# Patient Record
Sex: MALE | Race: WHITE | URBAN - METROPOLITAN AREA
[De-identification: names, ages, dates, MRNs, and addresses within clinical notes are randomized per-mention and may not be internally consistent; named-entity substitution may affect disease eponyms.]

---

## 2020-08-20 ENCOUNTER — OFFICE VISIT (OUTPATIENT)
Dept: ORTHOPEDIC SURGERY | Age: 65
End: 2020-08-20
Payer: COMMERCIAL

## 2020-08-20 VITALS — BODY MASS INDEX: 32.2 KG/M2 | WEIGHT: 230 LBS | TEMPERATURE: 97.9 F | HEIGHT: 71 IN

## 2020-08-20 PROCEDURE — L3170 FOOT PLAS HEEL STABI PRE OTS: HCPCS | Performed by: ORTHOPAEDIC SURGERY

## 2020-08-20 PROCEDURE — 99203 OFFICE O/P NEW LOW 30 MIN: CPT | Performed by: ORTHOPAEDIC SURGERY

## 2020-08-20 RX ORDER — ATORVASTATIN CALCIUM 80 MG/1
80 TABLET, FILM COATED ORAL DAILY
COMMUNITY
Start: 2020-02-10

## 2020-08-20 RX ORDER — GLIPIZIDE 10 MG/1
TABLET ORAL
COMMUNITY
Start: 2020-08-07

## 2020-08-20 RX ORDER — LEVOTHYROXINE SODIUM 0.03 MG/1
37.5 TABLET ORAL DAILY
COMMUNITY
Start: 2020-06-12

## 2020-08-20 RX ORDER — CARVEDILOL 6.25 MG/1
6.25 TABLET ORAL 2 TIMES DAILY WITH MEALS
COMMUNITY
Start: 2020-07-08

## 2020-08-20 RX ORDER — LOSARTAN POTASSIUM 50 MG/1
50 TABLET ORAL DAILY
COMMUNITY
Start: 2020-04-09

## 2020-08-20 SDOH — HEALTH STABILITY: MENTAL HEALTH: HOW OFTEN DO YOU HAVE A DRINK CONTAINING ALCOHOL?: NEVER

## 2020-08-20 ASSESSMENT — ENCOUNTER SYMPTOMS
COLOR CHANGE: 0
COUGH: 0

## 2020-08-20 NOTE — PROGRESS NOTES
and Sexual Activity    Alcohol use: Never     Frequency: Never    Drug use: Never    Sexual activity: None   Lifestyle    Physical activity     Days per week: None     Minutes per session: None    Stress: None   Relationships    Social connections     Talks on phone: None     Gets together: None     Attends Evangelical service: None     Active member of club or organization: None     Attends meetings of clubs or organizations: None     Relationship status: None    Intimate partner violence     Fear of current or ex partner: None     Emotionally abused: None     Physically abused: None     Forced sexual activity: None   Other Topics Concern    None   Social History Narrative    None       Current Outpatient Medications   Medication Sig Dispense Refill    carvedilol (COREG) 6.25 MG tablet Take 6.25 mg by mouth 2 times daily (with meals)      levothyroxine (SYNTHROID) 25 MCG tablet Take 37.5 mcg by mouth daily      losartan (COZAAR) 50 MG tablet Take 50 mg by mouth daily      linagliptin (TRADJENTA) 5 MG tablet TAKE 1 TABLET BY MOUTH DAILY      glipiZIDE (GLUCOTROL) 10 MG tablet TAKE 1 TABLET BY MOUTH TWICE DAILY WITH MEALS      atorvastatin (LIPITOR) 80 MG tablet Take 80 mg by mouth daily       No current facility-administered medications for this visit. No Known Allergies      Review of Systems:  A 14 point review of systems was completed by the patient and is available in the media section of the scanned medical record and was reviewed on 8/20/2020. The review is negative with the exception of those things mentioned in the HPI and Past Medical History   Review of Systems   Constitutional: Negative for fever. Respiratory: Negative for cough. Musculoskeletal: Positive for arthralgias. Skin: Negative for color change.       Vital Signs:   Temp 97.9 °F (36.6 °C) (Infrared)   Ht 5' 11\" (1.803 m)   Wt 230 lb (104.3 kg)   BMI 32.08 kg/m²     General Exam:    General: Juana Malhotra is a healthy and well appearing 60y.o. year old male who is sitting comfortably in our office in no acute distress. Neuro: Alert & Oriented x 3,  normal,  no focal deficits noted. Normal mood & affect. Eyes: Sclera clear  Ears: Normal external ear  Mouth:  No perioral lesions  Pulm: Respirations unlabored and regular   Skin: Warm, well perfused      Knee Examination: Left    Inspection:   No effusion, ecchymosis, discoloration, erythema, excessive warmth. no gross deformities, no signs of infection. Palpation: Tenderness of the proximal medial tibia. There is no patellofemoral crepitus, there is mild medial joint line tenderness. No other osseous or soft tissue tenderness around the knee. Negative calf tenderness    Range of Motion:  0-145 degrees without pain or difficulty    Strength:  5/5 quadriceps, 5/5 hamstrings    Special Tests: Mild pain with compression of the medial joint space. No ligament instability, valgus and varus stress test are stable at 0 and 30°. Lachman's exhibits a solid endpoint. Negative posterior drawer. Negative Homans sign    Gait:  Steady, Non antalgic, without the use of assistive devices    Alignment:  Varus deformity    Neuro: Sensation equal bilateral lower extremities    Vascular: 2+ posterior tibialis pulse    Radiology:     X-rays obtained and reviewed in office: AP and merchant view of both knees and a lateral of the left. Impression: No fractures, dislocations, visible tumors, or signs of acute trauma. Mild DJD of the medial joint space bilaterally. Patient has appropriate joint spacing in the patellofemoral joints bilaterally. Patella is riding appropriately in the trochlear groove with no subluxation or tilt noted. No osteophytes or bone growths noted. No loose bodies or foreign bodies.         Office Procedures:  Orders Placed This Encounter   Procedures    XR KNEE LEFT (3 VIEWS)     Standing Status:   Future     Number of Occurrences:   1     Standing Expiration Date:   8/20/2021            Assessment: Patient is a 60 y.o. male mild DJD and probable stress/insufficiency fracture of the medial tibial plateau    Visit Diagnoses       Codes    Left knee pain, unspecified chronicity    -  Primary M25.562          No orders of the defined types were placed in this encounter. Medical decision making:  Patient's workup and evaluation were reviewed with the patient in the office today. All the patient's questions were answered while in the clinic. The patient is understanding of all instructions and agrees with the plan. Treatment Plan: We had a good discussion today about  stress fractures of the tibia and how he may have overdone it while in [place]. He should notice it improving over the next several days to weeks. He also has some mild DJD of the medial tibiofemoral joint which he can control with activity modifications, weight management and a few sessions of physical therapy to teach him some home exercises. If he does not notice improvement over the coming weeks, we will have him return to the office for further evaluation for possible meniscal tear. It was also noted that he had some varus alignment of his heels for which we gave him some lateral posts. All questions were answered and he was in agreement with the plan moving forward. Follow up: 4 weeks      Approximately 30 minutes was spent on patient education and coordinating care. Gisell Ku, [address] [facility]     08/20/20  4:20 PM    This dictation was performed with a verbal recognition program [facility]) and it was checked for errors. It is possible that there are still dictated errors within this office note. If so, please bring any errors to my attention for an addendum. All efforts were made to ensure that this office note is accurate.     Supervising Physician Attestation:  I, Dr. Giovani Noriega, personally performed the services described in this documentation as scribed above, and it is both accurate and complete and I agree with all pertinent clinical information. I personally interviewed the patient and performed a physical examination and medical decision making. I discussed the patient's condition and treatment options and have  reviewed and agree with the past medical, family and social history unless otherwise noted. All of the patient's questions were answered. Board Certified Orthopaedic Surgeon  [facility] and [address] and [address] [employer]  Professor of [address]            This dictation was performed with a verbal recognition program [facility]) and it was checked for errors. It is possible that there are still dictated errors within this office note. If so, please bring any errors to my attention for an addendum. All efforts were made to ensure that this office note is accurate.

## 2020-08-31 ENCOUNTER — HOSPITAL ENCOUNTER (OUTPATIENT)
Dept: PHYSICAL THERAPY | Age: 65
Setting detail: THERAPIES SERIES
Discharge: HOME | End: 2020-08-31
Payer: COMMERCIAL

## 2020-08-31 PROCEDURE — 97110 THERAPEUTIC EXERCISES: CPT | Performed by: PHYSICAL THERAPIST

## 2020-08-31 PROCEDURE — 97162 PT EVAL MOD COMPLEX 30 MIN: CPT | Performed by: PHYSICAL THERAPIST

## 2020-08-31 PROCEDURE — 97530 THERAPEUTIC ACTIVITIES: CPT | Performed by: PHYSICAL THERAPIST

## 2020-08-31 NOTE — FLOWSHEET NOTE
[facility] [address] [place] [address] [facility]    [facility] Therapy Daily Treatment Note  Date:  2020    Patient Name:  Clois Meckel    :  1955  MRN: [id number]  Restrictions/Precautions:    Medical/Treatment Diagnosis Information:  Diagnosis: M17.12 (ICD-10-CM) - Primary osteoarthritis of left knee;M25.562 (ICD-10-CM) - Left knee pain, unspecified chronicity  Treatment Diagnosis: Knee pain/ swelling/ difficulty walking/ limited ROM/ LE strength deficit/ LE balance deficit    Insurance/Certification information:   ANTHEM/ EFF 10-2-17/ FAM DED 3000; MET 87.97/ FAM OOP MAX 5000; MET 87.97/ CO INS COV 85% WHEN MET/ PAT RESPONS WHEN MET 15%/ NO VISIT LIMIT/ POLICY RENEWS / Yumiok Zapata BY [employer] [phone]; [phone]/ RAJI/ REF #[id number]/ PAG/ 20  Physician Information:  Gabbie Mosley MD  Plan of care signed (Y/N): Pending    Date of Patient follow up with Physician: 4 weeks for Dr. Angel Carr, unless asymptomatic    G-Code (if applicable):      Date G-Code Applied:  20   69/ 80= 86%    Progress Note: [x]  Yes  []  No  Next due by: Visit #10       Latex Allergy:  [x]NO      []YES  Preferred Language for Healthcare:   [x]English       []other:    Visit # Insurance Allowable   1 No visit limit  Medical necessity     Pain Scale:  0 /10 @ rest                   2-3/ 10 @ worst  Easing factors: Rest; ice; meds; heel wedge  Provocative factors: Positional changes; walking; stairs  Type: []? Constant       [x]? Intermittent  []? Radiating     []? Localized     []? other:                   SUBJECTIVE: Anna Dorantes a 60 y. o. male who presents for evaluation of his left knee.  The patient has had 8 weeks of left knee pain which started in [place] after hiking and being more active than he is accustomed to. Louie Fernández denies any injury. Louie Fernández has tried heat, ibuprofen and brace without much symptom relief.  Of note he has a heart condition which precludes him from taking strong anti-inflammatories. Mykel Figueredo states that he is able to perform his ADLs without an issue and is able to walk for as long as needed without taking breaks. The patient denies any clicking, popping, catching, giving way, joint locking, numbness, paresthesias, or weakness. States knee doing better since seeing Dr. Lis Jane with the use of ice/ Tylenol/ lateral heel wedge.         OBJECTIVE:       LEFS Score: 69/ 80= 86% (8/31/20; Initial)     8-31-20  Flexibility L R Comment   Hamstring ++ ++     Gastroc ++ ++     ITB ++ ++     Quad ++ ++                  8-31-20            ROM PROM AROM Overpressure Comment     L R L R L R     Flexion 125 125         L pain @ end ROM   Extension 0 0                                                    8-31-20  Strength L R Comment   Quad 4+/5 5-/5 NIMESH   Hamstring 4/5 4+/5     Gastroc 4+/5 5/5     Hip  flexion 4/5 4+/5     Hip abd 4/5 4+/5                            8-31-20  Special Test Results/Comment   Meniscal Click (-)  Pain with ROT   Crepitus 0-1/3  30-0°   Flexion Test (+)  Pain at end ROM   Valgus Laxity (-)   Varus Laxity (-)   Lachmans (-)   Drop Back (-)   Homans (-)   Temperature (-)   LE (+) lateral tilt  (-) J sign  (-) apprehension      8-31-20  Girth L R   Calf 37.5 40.0   Mid Patella 41.3 41.8   Suprapatellar 40.5 41.8   5cm above 43.9 45.5   15cm above 52.2 53.9      Reflexes/Sensation:               []?Dermatomes/Myotomes intact               []?Reflexes equal and normal bilaterally              [x]? Other: NT     Joint mobility:               []?Normal                       [x]? Hypo              []?Hyper     Palpation: MJL     Functional Mobility/Transfers: Independent     Posture: Bilateral genu varum     Bandages/Dressings/Incisions: Viscoheel lateral heel wedge L     Gait: WFL      RESTRICTIONS/PRECAUTIONS:  Bilateral knee OA; diabetes; cardiac defibrillator/ pacemaker surgery; high blood pressure    Exercises/Interventions:   Exercise/Equipment Resistance/Repetitions Other comments 8/31/2020   Stretching      Hamstring 30\"x 5  x   Hip Flexion      ITB 30\"x 5 90-90 ROT x   Grion      Quad 30\"x 5 Prone; strap x   Inclined Calf 30\"x 5  x   Towel Pull            SLR      Supine 3x 10  x   Prone      Abduction      Adducton      SLR+            Isometrics      Quad sets            Patellar Glides      Medial      Superior      Inferior            ROM      Passive      Active      Weight Shift      Hang Weights      Sheet Pulls      Ankle Pumps            CKC      Calf raises 3x 10  x   Wall sits      Step ups      1 leg stand      Squatting      CC TKE      Balance            PRE      Extension  RANGE:    Flexion  RANGE:          Cable Column            Leg Press  RANGE:          Bike      Treadmill            Seated clams 3x 10 Grey x                       Other Therapeutic Activities:   L Lateral Viscoheel    Home Exercise Program:   See above and attached. Initial HEP discussed and completed. Full written, verbal, and demonstration provided. Patient Education:  Full conservative instructions provided. Written and verbal guidelines provided for but not limited to: DME/ HEP/ ICE/ gait/ general medical instructions. Manual Treatments:       Therapeutic Exercise and NMR EXR  [x] (98094) Provided verbal/tactile cueing for activities related to strengthening, flexibility, endurance, ROM for improvements in LE, proximal hip, and core control with self care, mobility, lifting, ambulation. [x] (04693) Provided verbal/tactile cueing for activities related to improving balance, coordination, kinesthetic sense, posture, motor skill, proprioception  to assist with LE, proximal hip, and core control in self care, mobility, lifting, ambulation and eccentric single leg control.      NMR and Therapeutic Activities:    [x] (71479 or 01263) Provided verbal/tactile cueing for activities related to improving balance, coordination, kinesthetic sense, posture, motor skill, proprioception and motor activation to allow for proper function of core, proximal hip and LE with self care and ADLs  [] (63679) Gait Re-education- Provided training and instruction to the patient for proper LE, core and proximal hip recruitment and positioning and eccentric body weight control with ambulation re-education including up and down stairs     Home Exercise Program:    [x] (10533) Reviewed/Progressed HEP activities related to strengthening, flexibility, endurance, ROM of core, proximal hip and LE for functional self-care, mobility, lifting and ambulation/stair navigation   [x] (10025)Reviewed/Progressed HEP activities related to improving balance, coordination, kinesthetic sense, posture, motor skill, proprioception of core, proximal hip and LE for self care, mobility, lifting, and ambulation/stair navigation      Manual Treatments:  PROM / STM / Oscillations-Mobs:  G-I, II, III, IV (PA's, Inf., Post.)  [x] (53896) Provided manual therapy to mobilize LE, proximal hip and/or LS spine soft tissue/joints for the purpose of modulating pain, promoting relaxation,  increasing ROM, reducing/eliminating soft tissue swelling/inflammation/restriction, improving soft tissue extensibility and allowing for proper ROM for normal function with self care, mobility, lifting and ambulation. Modalities:    [] hot packs   [] EMS   [] Ultrasound  [x] ice/ CP   [] vasopneumatic  [] high volt/EGS  [] phono   [] tens    [] ionto  [] autorange/biodex  [] Interferential  [] other      Charges:  Timed Code Treatment Minutes: 35'   Total Treatment Minutes: 76'     [x] EVAL: L2  [x] LZ(03948) x  1   [] IONTO  [] NMR (67930) x      [] VASO  [] Manual ([id number]) x       [] Other:  [x] TA x  1    [] Mech Traction (12870)  [] ES(attended) (81675)      [] ES (un) (69209):     GOALS:   Patient stated goal: Would like to have less knee pain in order to resume activity   []? Progressing: []? Met: []?  Not Met: []? core/proximal hip strength and neuromuscular control              [x]? Decreased LE functional strength   [x]? Reduced balance/proprioceptive control              []?other:       Functional Activity Limitations (from functional questionnaire and intake)              [x]? Reduced ability to tolerate prolonged functional positions              [x]? Reduced ability or difficulty with changes of positions or transfers between positions              [x]? Reduced ability to maintain good posture and demonstrate good body mechanics with sitting, bending, and lifting              []?Reduced ability to sleep              []? Reduced ability or tolerance with driving and/or computer work              [x]? Reduced ability to perform lifting, carrying tasks              []?Reduced ability to squat              []?Reduced ability to forward bend              [x]? Reduced ability to ambulate prolonged functional periods/distances/surfaces              [x]? Reduced ability to ascend/descend stairs              [x]? Reduced ability to run, hop or jump              []?other:     Participation Restrictions              []?Reduced participation in self care activities              []?Reduced participation in home management activities              []?Reduced participation in work activities              [x]? Reduced participation in social activities. [x]? Reduced participation in sport activities.     Classification :               []?Signs/symptoms consistent with post-surgical status including decreased ROM, strength and function.              []?Signs/symptoms consistent with joint sprain/strain              [x]? Signs/symptoms consistent with patella-femoral syndrome              [x]? Signs/symptoms consistent with knee OA/hip OA              [x]? Signs/symptoms consistent with internal derangement of knee/Hip              [x]? Signs/symptoms consistent with functional hip weakness/NMR control                 []?Signs/symptoms consistent with tendinitis/tendinosis                      []?signs/symptoms consistent with pathology which may benefit from Dry needling                       []?other:       Prognosis/Rehab Potential:                                       []?Excellent              [x]? Good                         []?Fair              []?Poor     Tolerance of evaluation/treatment:   [] Patient tolerated treatment well [x] Patient limited by fatique  [x] Patient limited by pain  [] Patient limited by other medical complications  [x] Other: Moderate functional ADL limitations include, but not limited to knee pain/ swelling/ difficulty walking/ limited ROM/ LE muscle weakness/ LE balance deficit       Prognosis: [x] Good [] Fair  [] Poor    Patient Requires Follow-up: [x] Yes  [] No    PLAN:   [] Continue per plan of care [] Alter current plan (see comments)  [x] Plan of care initiated [] Hold pending MD visit [] Discharge  Frequency/Duration:  1 days per week for 8 Weeks:  Interventions:    SLR progression  CKC/ hip control  Balance  PF protection principles    Electronically signed by: Guero Pichardo PT    Note: If patient does not return for scheduled/ recommended follow up visits, this note will serve as a discharge from care along with most recent update on progress.

## 2020-08-31 NOTE — PLAN OF CARE
The [address] [place] [address] Johan [address]  [phone]                                                       Physical Therapy Certification    Dear Gena Ramires MD,    We had the pleasure of evaluating the following patient for physical therapy services at [address] [place] [address]. A summary of our findings can be found in the initial assessment below. This includes our plan of care. If you have any questions or concerns regarding these findings, please do not hesitate to contact me at the office phone number checked above.   Thank you for the referral.       Physician Signature:_______________________________Date:__________________  By signing above (or electronic signature), therapists plan is approved by physician      Patient: Nickie Kiran   : 1955   MRN: [id number]  Referring Physician: Referring Practitioner: Gena Ramires MD      Evaluation Date: 2020      Medical Diagnosis Information:  Diagnosis: M17.12 (ICD-10-CM) - Primary osteoarthritis of left knee;M25.562 (ICD-10-CM) - Left knee pain, unspecified chronicity   Treatment Diagnosis: Knee pain/ swelling/ difficulty walking/ limited ROM/ LE strength deficit/ LE balance deficit                                         Insurance information:  ANTHEM/ EFF 10-2-17/ FAM DED 3000; MET 87.97/ FAM OOP MAX 5000; MET 87.97/ CO INS COV 85% WHEN MET/ PAT RESPONS WHEN MET 15%/ NO VISIT LIMIT/ POLICY RENEWS 3-94-55/ Miranda Cook BY [employer] [phone]; [phone]/ RAJI/ REF #[id number]/ [facility]/ 20     Precautions/ Contra-indications: Bilateral knee OA; diabetes; cardiac defibrillator/ pacemaker surgery; high blood pressure    C-SSRS Triggered by Intake questionnaire (Past 2 wk assessment):   [x] No, Questionnaire did not trigger screening.   [] Yes, Patient intake triggered further evaluation      [] C-SSRS Screening completed  [] PCP notified via Plan of Care  [] Emergency services notified     Latex Allergy:  [x]NO      []YES  Preferred Language for Healthcare:   [x]English       []other:    SUBJECTIVE: Mike Smith is a 60 y.o. male who presents for evaluation of his left knee. The patient has had 8 weeks of left knee pain which started in [place] after hiking and being more active than he is accustomed to. He denies any injury. He has tried heat, ibuprofen and brace without much symptom relief. Of note he has a heart condition which precludes him from taking strong anti-inflammatories. He states that he is able to perform his ADLs without an issue and is able to walk for as long as needed without taking breaks. The patient denies any clicking, popping, catching, giving way, joint locking, numbness, paresthesias, or weakness. States knee doing better since seeing Dr. Rush Simmons with the use of ice/ Tylenol/ lateral heel wedge. Relevant Medical History: Diabetes (meds); high blood pressure (meds); cardiac (surgery for defibrillator/ pacemaker; meds); knee OA   Functional Disability Index: LEFS Score: 69/ 80= 86%    Pain Scale:  0 /10 @ rest  2-3/ 10 @ worst  Easing factors: Rest; ice; meds; heel wedge  Provocative factors: Positional changes; walking; stairs    Type: []Constant   [x]Intermittent  []Radiating []Localized []other:     Numbness/Tingling: None    Occupation/School:  (walking/ sitting/ squatting/ climbing)    Living Status/Prior Level of Function: Independent with ADLs and IADLs, Fitness walking; recreational softball; golf (1x week; Pre-Covid 3-4x week)    OBJECTIVE:      LEFS Score: 69/ 80= 86% (8/31/20;  Initial)    8-31-20  Flexibility L R Comment   Hamstring ++ ++    Gastroc ++ ++    ITB ++ ++    Quad ++ ++            8-31-20  ROM PROM AROM Overpressure Comment    L R L R L R    Flexion 125 125     L pain @ end ROM   Extension 0 0                              8-31-20  Strength L R Comment   Quad 4+/5 5-/5 NIMESH Hamstring 4/5 4+/5    Gastroc 4+/5 5/5    Hip  flexion 4/5 4+/5    Hip abd 4/5 4+/5                  8-31-20  Special Test Results/Comment   Meniscal Click (-)  Pain with ROT   Crepitus 0-1/3  30-0°   Flexion Test (+)  Pain at end ROM   Valgus Laxity (-)   Varus Laxity (-)   Lachmans (-)   Drop Back (-)   Homans (-)   Temperature (-)   LE (+) lateral tilt  (-) J sign  (-) apprehension     8-31-20  Girth L R   Calf 37.5 40.0   Mid Patella 41.3 41.8   Suprapatellar 40.5 41.8   5cm above 43.9 45.5   15cm above 52.2 53.9     Reflexes/Sensation:    []Dermatomes/Myotomes intact    []Reflexes equal and normal bilaterally   [x]Other: NT    Joint mobility:    []Normal    [x]Hypo   []Hyper    Palpation: MJL    Functional Mobility/Transfers: Independent    Posture: Bilateral genu varum    Bandages/Dressings/Incisions: Viscoheel lateral heel wedge L    Gait: [facility]    Orthopedic Special Tests:                        [x] Patient history, allergies, meds reviewed. Medical chart reviewed. See intake form. Review Of Systems (ROS):  [x]Performed Review of systems (Integumentary, CardioPulmonary, Neurological) by intake and observation. Intake form has been scanned into medical record. Patient has been instructed to contact their primary care physician regarding ROS issues if not already being addressed at this time.       Co-morbidities/Complexities (which will affect course of rehabilitation):   []None           Arthritic conditions   []Rheumatoid arthritis (M05.9)  [x]Osteoarthritis (M19.91)   Cardiovascular conditions   [x]Hypertension (I10)  []Hyperlipidemia (E78.5)  []Angina pectoris (I20)  []Atherosclerosis (I70)   Musculoskeletal conditions   []Disc pathology   []Congenital spine pathologies   [x]Prior surgical intervention  []Osteoporosis (M81.8)  []Osteopenia (M85.8)   Endocrine conditions   []Hypothyroid (E03.9)  []Hyperthyroid Gastrointestinal conditions   []Constipation (Y58.35)   Metabolic conditions   []Morbid obesity (E66.01)  [x]Diabetes type 1(E10.65) or 2 (E11.65)   []Neuropathy (G60.9)     Pulmonary conditions   []Asthma (J45)  []Coughing   []COPD (J44.9)   Psychological Disorders  []Anxiety (F41.9)  []Depression (F32.9)   []Other:   [x]Other:     *Cardiac pacemaker type surgery  *L Achilles Repair  *Overweight     Barriers to/and or personal factors that will affect rehab potential:              []Age  []Sex              []Motivation/Lack of Motivation                        [x]Co-Morbidities              []Cognitive Function, education/learning barriers              []Environmental, home barriers              []profession/work barriers  []past PT/medical experience  []other:  Justification: Medical factors directly relate to exercise intensity/ weight management/ program progression    Falls Risk Assessment (30 days):   [x] Falls Risk assessed and no intervention required.   [] Falls Risk assessed and Patient requires intervention due to being higher risk   TUG score (>12s at risk):     [] Falls education provided, including         ASSESSMENT:   Functional Impairments:     [x]Noted lumbar/proximal hip/LE hypomobility   [x]Decreased LE functional ROM   [x]Decreased core/proximal hip strength and neuromuscular control   [x]Decreased LE functional strength   [x]Reduced balance/proprioceptive control   []other:      Functional Activity Limitations (from functional questionnaire and intake)   [x]Reduced ability to tolerate prolonged functional positions   [x]Reduced ability or difficulty with changes of positions or transfers between positions   [x]Reduced ability to maintain good posture and demonstrate good body mechanics with sitting, bending, and lifting   []Reduced ability to sleep   [] Reduced ability or tolerance with driving and/or computer work   [x]Reduced ability to perform lifting, carrying tasks   []Reduced ability to squat   []Reduced ability to forward bend   [x]Reduced ability to ambulate prolonged elements   [x] A clinical presentation with:  [] stable and/or uncomplicated characteristics   [x] evolving clinical presentation with changing characteristics  [] unstable and unpredictable characteristics;   [x] Clinical decision making of [] low, [x] moderate, [] high complexity using standardized patient assessment instrument and/or measurable assessment of functional outcome. [] EVAL (LOW) 95939 (typically 20 minutes face-to-face)  [x] EVAL (MOD) 98816 (typically 30 minutes face-to-face)  [] EVAL (HIGH) 71847 (typically 45 minutes face-to-face)  [] RE-EVAL       PLAN  Frequency/Duration:  1 days per week for 8 Weeks:  Interventions:  [x]  Therapeutic exercise including: strength training, ROM, for Lower extremity and core   [x]  NMR activation and proprioception for LE, Glutes and Core   [x]  Manual therapy as indicated for LE, Hip and spine to include: Dry Needling/IASTM, STM, PROM, Gr I-IV mobilizations, manipulation. [x] Modalities as needed that may include: thermal agents, E-stim, Biofeedback, US, iontophoresis as indicated  [x] Patient education on joint protection, postural re-education, activity modification, progression of HEP. HEP instruction: See attached for HEP/ HTP/ full OA precautions located in the Media tab of [facility]    GOALS:   Patient stated goal: Would like to have less knee pain in order to resume activity   [] Progressing: [] Met: [] Not Met: [] Adjusted    Therapist goals for Patient:   Short Term Goals: To be achieved in: 2 weeks  1. Independent in HEP and progression per patient tolerance, in order to prevent re-injury. [] Progressing: [] Met: [] Not Met: [] Adjusted   2. Patient will have a decrease in pain to facilitate improvement in movement, function, and ADLs as indicated by Functional Deficits. [] Progressing: [] Met: [] Not Met: [] Adjusted    Long Term Goals: To be achieved in: 8 weeks  1.  Disability index score of 10% or less for the LEFS to assist with reaching prior level of function. [] Progressing: [] Met: [] Not Met: [] Adjusted  2. Patient will demonstrate increased AROM to 0-130 deg to allow for proper joint functioning as indicated by patients Functional Deficits. [] Progressing: [] Met: [] Not Met: [] Adjusted  3. Patient will demonstrate an increase in Strength to good proximal hip strength and control, within 5lb HHD; Biodex testing for bilateral/ TQBW/ HQ ratios corrected for age/ sex/ BW in LE to allow for proper functional mobility as indicated by patients Functional Deficits. [] Progressing: [] Met: [] Not Met: [] Adjusted  4. Patient will return to 90%>  functional activities without increased symptoms or restriction. [] Progressing: [] Met: [] Not Met: [] Adjusted  5. Patient will resume a conditioning and fitness program with OA precautions and AHA guidelines   [] Progressing: [] Met: [] Not Met: [] Adjusted       Electronically signed by:  Marylene Grosser, PT    Note: If patient does not return for scheduled/ recommended follow up visits, this note will serve as a discharge from care along with most recent update on progress.

## 2020-12-11 ENCOUNTER — OFFICE VISIT (OUTPATIENT)
Dept: ORTHOPEDIC SURGERY | Age: 65
End: 2020-12-11
Payer: COMMERCIAL

## 2020-12-11 VITALS — WEIGHT: 229.94 LBS | TEMPERATURE: 96.9 F | HEIGHT: 71 IN | BODY MASS INDEX: 32.19 KG/M2

## 2020-12-11 PROCEDURE — 99213 OFFICE O/P EST LOW 20 MIN: CPT | Performed by: ORTHOPAEDIC SURGERY

## 2020-12-11 NOTE — PROGRESS NOTES
carvedilol (COREG) 6.25 MG tablet, Take 6.25 mg by mouth 2 times daily (with meals), Disp: , Rfl:     levothyroxine (SYNTHROID) 25 MCG tablet, Take 37.5 mcg by mouth daily, Disp: , Rfl:     losartan (COZAAR) 50 MG tablet, Take 50 mg by mouth daily, Disp: , Rfl:     linagliptin (TRADJENTA) 5 MG tablet, TAKE 1 TABLET BY MOUTH DAILY, Disp: , Rfl:     glipiZIDE (GLUCOTROL) 10 MG tablet, TAKE 1 TABLET BY MOUTH TWICE DAILY WITH MEALS, Disp: , Rfl:     atorvastatin (LIPITOR) 80 MG tablet, Take 80 mg by mouth daily, Disp: , Rfl:       No Known Allergies      Review of Systems: A 14 point review of systems available in the scanned medical record as documented by the patient on  8/20/20. Today's review pertinent items are noted in HPI. Review of Systems    Done: 8/20/20    Physical Exam:  Temp 96.9 °F (36.1 °C) (Infrared)   Ht 5' 10.98\" (1.803 m)   Wt 229 lb 15 oz (104.3 kg)   BMI 32.08 kg/m²         General: No acute distress, well nourished  CV: No obvious peripheral edema. Normal peripheral pulses  Neuro: Alert & oriented x 3  Psych: Normal mood and affect    Knee Examination:  Left     Inspection:  No edema or effusion  Alignment: Varus  Palpation: There is  no patellofemoral crepitus, there is  medial joint line tenderness. Range of Motion:   0-120  Strength: Motor is grossly intact  Stability: 5 mm medial and 0 mm lateral opening, negative Lachman  Special Tests: Negative Christine  Gait:  Antalgic  Neuro: Sensation equal bilateral lower extremities   Vascular: 2+ posterior tibialis pulse       Radiographic:  No new imaging today    Assessment:  Lydia Saavedra is a 60 y.o. male with:  1. Left medial meniscus tear    Impression:  No diagnosis found. Office Procedures:  No orders of the defined types were placed in this encounter. Plan:   Pertinent imaging was reviewed. The etiology, natural history, and treatment options for the disorder were discussed.   The roles of activity modifications, medications, injections, physical therapy, and surgical interventions were all described to the patient and questions were answered. Discussed with the patient that we are concerned he has a medial meniscus tear. We would like to order an MRI to evaluate the extent of the tear. He will need to follow-up after this is performed. [address] Fellow    The encounter with Delta Liang was supervised by Dr Abel Birmingham who personally examined the patient and reviewed the plan. This dictation was performed with a verbal recognition program (DRAGON) and it was checked for errors. It is possible that there are still dictated errors within this office note. If so, please bring any errors to my attention for an addendum. All efforts were made to ensure that this office note is accurate. This dictation was performed with a verbal recognition program (DRAGON) and it was checked for errors. It is possible that there are still dictated errors within this office note. If so, please bring any errors to my attention for an addendum. All efforts were made to ensure that this office note is accurate. Supervising Physician Attestation:  I, Dr. Stephen Suero, personally performed the services described in this documentation as scribed above, and it is both accurate and complete and I agree with all pertinent clinical information. I personally interviewed the patient and performed a physical examination and medical decision making. I discussed the patient's condition and treatment options and have  reviewed and agree with the past medical, family and social history unless otherwise noted. All of the patient's questions were answered.       Board Certified Orthopaedic Surgeon  [facility] and [address]  President and Medical Director  Cam Bermudez  Professor of [facility], [address] [place] [address] [facility]

## 2020-12-17 ENCOUNTER — TELEPHONE (OUTPATIENT)
Dept: ORTHOPEDIC SURGERY | Age: 65
End: 2020-12-17

## 2020-12-17 NOTE — TELEPHONE ENCOUNTER
Returned patients call and told him I would contact [facility] to find out what the hold up is with scheduling the MRI

## 2020-12-17 NOTE — TELEPHONE ENCOUNTER
Other PATIENT CALLING REGARDING MRI, STATES HE WAS NOT ABLE TO GET THE MRI, AWAITING CALLBACK TO SCHEDULE MRI CALLBACK [phone].